# Patient Record
Sex: FEMALE | Race: WHITE | Employment: UNEMPLOYED | URBAN - METROPOLITAN AREA
[De-identification: names, ages, dates, MRNs, and addresses within clinical notes are randomized per-mention and may not be internally consistent; named-entity substitution may affect disease eponyms.]

---

## 2020-03-10 ENCOUNTER — HOSPITAL ENCOUNTER (OUTPATIENT)
Dept: LAB | Age: 40
Discharge: HOME | End: 2020-03-10

## 2020-03-10 PROCEDURE — 88312 SPECIAL STAINS GROUP 1: CPT

## 2020-03-10 PROCEDURE — 88305 TISSUE EXAM BY PATHOLOGIST: CPT

## 2022-06-16 ENCOUNTER — HOSPITAL ENCOUNTER (OUTPATIENT)
Dept: GENERAL RADIOLOGY | Age: 42
Discharge: HOME | End: 2022-06-19
Payer: COMMERCIAL

## 2022-06-16 DIAGNOSIS — M06.4 INFLAMMATORY POLYARTHRITIS (HCC): ICD-10-CM

## 2022-06-16 PROCEDURE — 72202 X-RAY EXAM SI JOINTS 3/> VWS: CPT

## 2024-11-15 ENCOUNTER — TRANSCRIBE ORDERS (OUTPATIENT)
Dept: SCHEDULING | Age: 44
End: 2024-11-15

## 2024-11-15 DIAGNOSIS — M47.812 CERVICAL SPONDYLOSIS: Primary | ICD-10-CM

## 2025-07-29 ENCOUNTER — OFFICE VISIT (OUTPATIENT)
Dept: ORTHOPEDIC SURGERY | Age: 45
End: 2025-07-29

## 2025-07-29 DIAGNOSIS — M25.572 PAIN IN LEFT ANKLE AND JOINTS OF LEFT FOOT: Primary | ICD-10-CM

## 2025-07-29 RX ORDER — METHOCARBAMOL 750 MG/1
1 TABLET ORAL
COMMUNITY

## 2025-07-29 RX ORDER — AZITHROMYCIN 250 MG/1
TABLET, FILM COATED ORAL
COMMUNITY
Start: 2025-04-23

## 2025-07-29 RX ORDER — ATOGEPANT 60 MG/1
1 TABLET ORAL DAILY
COMMUNITY

## 2025-07-29 NOTE — PROGRESS NOTES
Name: Isabell Quiñonez  YOB: 1980  Gender: female  MRN: [id number]    Summary:   Left lateral plantar foot pain.  Symptoms overlap with 3/4 neuroma and fifth metatarsal head bursitis and lateral plantar nerve issue.  Unsure which one is the diagnosis    Middle school nurse    Injection into plantar fifth metatarsal head bursa injection given, metatarsal cookies given    Will follow-up discussed pain.      History of Present Illness  The patient presents with left foot pain.    The patient reports experiencing acute pain localized to the lateral aspect of the left foot for several months. The pain intensifies following periods of rest and exhibits slight improvement throughout the day. Ambulation without footwear results in significant discomfort, and the use of slippers with arch support exacerbates the pain. The patient denies recent engagement in strenuous physical activities.    The patient has a diagnosis of undifferentiated autoimmune disease, with differential considerations including psoriatic arthritis or non-radiographic seronegative arthritis, supported by elevated inflammatory markers and antinuclear antibody (MEHREEN) levels. They report atypical neuropathic pain and have a history of hip and knee surgeries secondary to severe sciatic nerve pathology. The patient is contemplating dry needling as a therapeutic intervention for the current foot pain.    PAST SURGICAL HISTORY:  - Hip surgery due to severe sciatic nerve issues  - Knee surgery    SOCIAL HISTORY  Occupations: Nurse  Exercise: Previously a runner, but has not run in a long time      ROS/Meds/PSH/PMH/FH/SH: see bottom of not for full  Patient Denies fever/chills, headache, visual changes, chest pain, shortness of breath, and nausea/vomiting/diarrhea     Tobacco:  reports that she quit smoking about 26 years ago. She has never used smokeless tobacco.  No results found for: \"LABA1C\"    Physical Exam:  DP pulse: 2+  Neuro: normal